# Patient Record
Sex: MALE | Race: ASIAN | Employment: FULL TIME | URBAN - METROPOLITAN AREA
[De-identification: names, ages, dates, MRNs, and addresses within clinical notes are randomized per-mention and may not be internally consistent; named-entity substitution may affect disease eponyms.]

---

## 2018-10-11 ENCOUNTER — OFFICE VISIT (OUTPATIENT)
Dept: FAMILY MEDICINE | Facility: CLINIC | Age: 33
End: 2018-10-11
Payer: COMMERCIAL

## 2018-10-11 VITALS — HEART RATE: 98 BPM | SYSTOLIC BLOOD PRESSURE: 132 MMHG | DIASTOLIC BLOOD PRESSURE: 78 MMHG | OXYGEN SATURATION: 98 %

## 2018-10-11 DIAGNOSIS — L43.9 LICHEN PLANUS: Primary | ICD-10-CM

## 2018-10-11 PROCEDURE — 86704 HEP B CORE ANTIBODY TOTAL: CPT | Performed by: FAMILY MEDICINE

## 2018-10-11 PROCEDURE — 99214 OFFICE O/P EST MOD 30 MIN: CPT | Performed by: FAMILY MEDICINE

## 2018-10-11 PROCEDURE — 36415 COLL VENOUS BLD VENIPUNCTURE: CPT | Performed by: FAMILY MEDICINE

## 2018-10-11 RX ORDER — TRIAMCINOLONE ACETONIDE 1 MG/G
CREAM TOPICAL 2 TIMES DAILY
Qty: 80 G | Refills: 0 | Status: SHIPPED | OUTPATIENT
Start: 2018-10-11 | End: 2018-11-21 | Stop reason: ALTCHOICE

## 2018-10-11 NOTE — LETTER
10/11/2018         RE: Gordy Miller  [address]  [facility] [address]        Dear Colleague,    Thank you for referring your patient, Gordy Miller, to the [facility] [place]. Please see a copy of my visit note below.    [facility] - PRIMARY CARE SKIN    CC : Rash   SUBJECTIVE:   Gordy Miller is a 33 year old male who presents to clinic today because of a(n) rash on the hands and legs that began 3-4 months ago. His skin often becomes itchy on the legs after showering. To help with this he will apply Aveeno lotion. He does note that he had a similar rash in the past on his left ankle and was treated in the [place].      Pruritic : mildly itching.  Symptoms appear to be : somewhat worsening  Aggravating factors : heat.  Relieving factors : none noted    Previous similar history : Yes: previously treated in the [place].  Recent exposure history : none known   Any other family members with similar symptoms : No.  Other current medications : none.    Therapies tried : Aveeno lotion.    Personal Medical History  Skin Cancer : NO  Eczema Psoriasis Rosacea Autoimmune   NO NO NO NO     Family Medical History  Skin Cancer : NO  Eczema Psoriasis Rosacea Autoimmune   NO NO NO NO     Occupation : student (indoor).    There is no problem list on file for this patient.      No past medical history on file. No past surgical history on file.   Social History   Substance Use Topics     Smoking status: Never Smoker     Smokeless tobacco: Never Used     Alcohol use Not on file         Prescription Medications as of 10/11/2018             triamcinolone (KENALOG) 0.1 % cream Apply topically 2 times daily Not to be used more then 10-14 consecutive days, not to be used on face or groin          No Known Allergies     INTEGUMENTARY/SKIN: POSITIVE for pruritis and rash     ROS : 14 point review of systems was negative except the symptoms listed above in the HPI.    This  document serves as a record of the services and decisions personally performed and made by Tracy Cottrell MD. It was created on her behalf by Sulma Holder, a trained medical scribe.  The creation of this document is based on the scribe's personal observations and the provider's statements to the medical scribe.  Sulma Holder, October 11, 2018 2:20 PM    OBJECTIVE:   GENERAL: healthy, alert and no distress  SKIN: Jeffery Skin Type - IV.  Face, Legs and Hands were examined. The dermatoscope was used to help evaluate pigmented lesions.  Skin Pertinent Findings:  Left medial ankle : patches of residual hypopigmentation    Right lateral ankle : linear distribution of flat topped violaceous, non-scaly, polygonal lesions    Volar wrists, forearms, dorsal hands : linear distribution of flat topped violaceous, non-scaly, polygonal lesions    Oral mucosa : clear    Diagnostic Test Results:  none     MDM : Dicussed lichen planus treatment, causes, natural history .Education pamphlet given.    ASSESSMENT:     Encounter Diagnosis   Name Primary?     Lichen planus Yes       PLAN:   Patient Instructions   FUTURE APPOINTMENTS  Follow up as needed    TOPICAL STEROID INSTRUCTIONS  Triamcinolone 0.1% cream.  1. Wash hands before applying topical steroid. Use the adult fingertip unit (FTU) as a guide.    2. Apply sparingly (just enough to rub in) onto affected areas of the hands and legs (not to exceed 1 FTU), two times per day for 10-14 days.  3. Wash off any excess, unused topical steroid.    This higher strength steroid should never be used on face nor groin.    After the initial treatment, topical steroid may be used as needed for flare-ups but only for short-term treatment.    If you are using this for prolonged periods of time to control flare-ups, return to clinic for re-evaluation of treatment.    Keep in mind to also regularly use moisturizer, as this preventative measure can help maintain your skin's natural protective  moisture barrier.      PROCEDURES:   None.    TT : 25 minutes.  CT : 20 minutes.      The information in this document, created by the medical scribe for me, accurately reflects the services I personally performed and the decisions made by me. I have reviewed and approved this document for accuracy prior to leaving the patient care area.  Tracy Cottrell MD October 11, 2018 2:20 PM  [facility] [place]    Again, thank you for allowing me to participate in the care of your patient.        Sincerely,        Gracy Cottrell MD

## 2018-10-11 NOTE — PROGRESS NOTES
[facility] - PRIMARY CARE SKIN    CC : Rash   SUBJECTIVE:   Gordy Miller is a 33 year old male who presents to clinic today because of a(n) rash on the hands and legs that began 3-4 months ago. His skin often becomes itchy on the legs after showering. To help with this he will apply Aveeno lotion. He does note that he had a similar rash in the past on his left ankle and was [place]      Pruritic : mildly itching.  Symptoms appear to be : somewhat worsening  Aggravating factors : heat.  Relieving factors : none noted    Previous similar history : Yes: previously [place]  Recent exposure history : none known   Any other family members with similar symptoms : No.  Other current medications : none.    Therapies tried : Aveeno lotion.    Personal Medical History  Skin Cancer : NO  Eczema Psoriasis Rosacea Autoimmune   NO NO NO NO     Family Medical History  Skin Cancer : NO  Eczema Psoriasis Rosacea Autoimmune   NO NO NO NO     Occupation : student (indoor).    There is no problem list on file for this patient.      No past medical history on file. No past surgical history on file.   Social History   Substance Use Topics     Smoking status: Never Smoker     Smokeless tobacco: Never Used     Alcohol use Not on file         Prescription Medications as of 10/11/2018             triamcinolone (KENALOG) 0.1 % cream Apply topically 2 times daily Not to be used more then 10-14 consecutive days, not to be used on face or groin          No Known Allergies     INTEGUMENTARY/SKIN: POSITIVE for pruritis and rash     ROS : 14 point review of systems was negative except the symptoms listed above in the HPI.    This document serves as a record of the services and decisions personally performed and made by Tracy Cottrell MD. It was created on her behalf by Sulma Holder, a trained medical scribe.  The creation of this document is based on the scribe's personal observations and the provider's  statements to the medical scribe.  Sulma Holder, October 11, 2018 2:20 PM    OBJECTIVE:   GENERAL: healthy, alert and no distress  SKIN: Jeffery Skin Type - IV.  Face, Legs and Hands were examined. The dermatoscope was used to help evaluate pigmented lesions.  Skin Pertinent Findings:  Left medial ankle : patches of residual hypopigmentation    Right lateral ankle : linear distribution of flat topped violaceous, non-scaly, polygonal lesions    Volar wrists, forearms, dorsal hands : linear distribution of flat topped violaceous, non-scaly, polygonal lesions    Oral mucosa : clear    Diagnostic Test Results:  none     MDM : Dicussed lichen planus treatment, causes, natural history .Education pamphlet given.    ASSESSMENT:     Encounter Diagnosis   Name Primary?     Lichen planus Yes       PLAN:   Patient Instructions   FUTURE APPOINTMENTS  Follow up as needed    TOPICAL STEROID INSTRUCTIONS  Triamcinolone 0.1% cream.  1. Wash hands before applying topical steroid. Use the adult fingertip unit (FTU) as a guide.    2. Apply sparingly (just enough to rub in) onto affected areas of the hands and legs (not to exceed 1 FTU), two times per day for 10-14 days.  3. Wash off any excess, unused topical steroid.    This higher strength steroid should never be used on face nor groin.    After the initial treatment, topical steroid may be used as needed for flare-ups but only for short-term treatment.    If you are using this for prolonged periods of time to control flare-ups, return to clinic for re-evaluation of treatment.    Keep in mind to also regularly use moisturizer, as this preventative measure can help maintain your skin's natural protective moisture barrier.      PROCEDURES:   None.    TT : 25 minutes.  CT : 20 minutes.      The information in this document, created by the medical scribe for me, accurately reflects the services I personally performed and the decisions made by me. I have reviewed and approved this  document for accuracy prior to leaving the patient care area.  Tracy Cottrell MD October 11, 2018 2:20 PM  [facility] [place]

## 2018-10-11 NOTE — MR AVS SNAPSHOT
After Visit Summary   10/11/2018    Gordy Miller    MRN: [id number]           Patient Information     Date Of Birth          1985        Visit Information        Provider Department      10/11/2018 2:20 PM Gracy Cottrell MD [facility] [place]        Today's Diagnoses     Lichen planus    -  1      Care Instructions    FUTURE APPOINTMENTS  Follow up in 2-3 weeks.     TOPICAL STEROID INSTRUCTIONS  Triamcinolone 0.1% cream.  1. Wash hands before applying topical steroid. Use the adult fingertip unit (FTU) as a guide.    2. Apply sparingly (just enough to rub in) onto affected areas of the hands and legs (not to exceed 1 FTU), two times per day for 10-14 days.  3. Wash off any excess, unused topical steroid.    This higher strength steroid should never be used on face nor groin.    After the initial treatment, topical steroid may be used as needed for flare-ups but only for short-term treatment.    If you are using this for prolonged periods of time to control flare-ups, return to clinic for re-evaluation of treatment.    Keep in mind to also regularly use moisturizer, as this preventative measure can help maintain your skin's natural protective moisture barrier.    DRY SKIN MANAGEMENT INSTRUCTIONS  Routine use of moisturizer is important for healthy, resilient skin not just for soft skin.     Sealing in moisture    Twice daily use of a moisturizer such as over-the-counter (OTC) CeraVe moisturizer cream (in the jar). CeraVe products contain ceramides and filaggrin proteins that can help to maintain the body's moisture layer.    After cleansing or washing, always apply moisturizer immediately after drying off (pat dry only) for best effect.    Protection while hydrating    Do not overuse soap. Unless you have been sweating extensively, just apply soap to groin and armpits.    Recommended products for body include: OTC unscented Dove for sensitive skin or OTC  "Vanicream cleansing bar.    Recommended facial cleansers include: OTC CeraVe hydrating facial cleanser or OTC Cetaphil daily facial cleanser.    Avoid use of    Scented/perfumed products    Irritating clothing (wool, new jeans, new/unwashed clothing, scratchy synthetics)    Neosporin or triple antibiotic topical products    Products containing aloe, herbs, Vitamin E, or other \"natural ingredients\".    Dryer sheets or fabric softeners (while symptoms are present)    If a topical medication is prescribed, apply topical prescription first, followed by use of moisturizing product.            Follow-ups after your visit        Follow-up notes from your care team     Return in about 3 weeks (around 11/1/2018).      Who to contact     If you have questions or need follow up information about today's clinic visit or your schedule please contact [facility] [place] directly at [phone].  Normal or non-critical lab and imaging results will be communicated to you by Critical Diagnosticshart, letter or phone within 4 business days after the clinic has received the results. If you do not hear from us within 7 days, please contact the clinic through Critical Diagnosticshart or phone. If you have a critical or abnormal lab result, we will notify you by phone as soon as possible.  Submit refill requests through Strikeface or call your pharmacy and they will forward the refill request to us. Please allow 3 business days for your refill to be completed.          Additional Information About Your Visit        Strikeface Information     Strikeface lets you send messages to your doctor, view your test results, renew your prescriptions, schedule appointments and more. To sign up, go to [email or web address] . Click on \"Log in\" on the left side of the screen, which will take you to the Welcome page. Then click on \"Sign up Now\" on the right side of the page.     You will be asked to enter the access code listed below, as well as some personal information. Please " follow the directions to create your username and password.     Your access code is: [email or web address]  Expires: 2019  2:36 PM     Your access code will  in 90 days. If you need help or a new code, please call your [place] clinic or [phone].        Care EveryWhere ID     This is your Care EveryWhere ID. This could be used by other organizations to access your [place] medical records  [id number]        Your Vitals Were     Pulse Pulse Oximetry                98 98%           Blood Pressure from Last 3 Encounters:   10/11/18 132/78    Weight from Last 3 Encounters:   No data found for Wt              We Performed the Following     Hepatitis B core antibody          Today's Medication Changes          These changes are accurate as of 10/11/18  2:36 PM.  If you have any questions, ask your nurse or doctor.               Start taking these medicines.        Dose/Directions    triamcinolone 0.1 % cream   Commonly known as:  KENALOG   Used for:  Lichen planus   Started by:  Gracy Cottrell MD        Apply topically 2 times daily Not to be used more then 10-14 consecutive days, not to be used on face or groin   Quantity:  80 g   Refills:  0            Where to get your medicines      These medications were sent to [facility] [address] [address]     Phone:  [phone]     triamcinolone 0.1 % cream                Primary Care Provider    Provider Not In System                Equal Access to Services     DONI SCHMITT AH: Hadii doreen caseyo Somagda, waaxda luqadaha, qaybta kaalmada adeegyada, waxjordan lizz cast adecarolee cain. So [facility] [phone].    ATENCIÓN: Si habla español, tiene a arita disposición servicios gratuitos de asistencia lingüística. Llame al [phone].    We comply with applicable federal civil rights laws and [place] laws. We do not discriminate on the basis of race, color,  national origin, age, disability, sex, sexual orientation, or gender identity.            Thank you!     Thank you for choosing [facility]  for your care. Our goal is always to provide you with excellent care. Hearing back from our patients is one way we can continue to improve our services. Please take a few minutes to complete the written survey that you may receive in the mail after your visit with us. Thank you!             Your Updated Medication List - Protect others around you: Learn how to safely use, store and throw away your medicines at www.disposemymeds.org.          This list is accurate as of 10/11/18  2:36 PM.  Always use your most recent med list.                   Brand Name Dispense Instructions for use Diagnosis    triamcinolone 0.1 % cream    KENALOG    80 g    Apply topically 2 times daily Not to be used more then 10-14 consecutive days, not to be used on face or groin    Lichen planus

## 2018-10-12 LAB — HBV CORE AB SERPL QL IA: NONREACTIVE

## 2018-10-24 ENCOUNTER — TELEPHONE (OUTPATIENT)
Dept: FAMILY MEDICINE | Facility: CLINIC | Age: 33
End: 2018-10-24

## 2018-10-24 NOTE — TELEPHONE ENCOUNTER
Mail returned- need accurate address.    Need to give normal test results.  Left message on answering machine for patient to call back.    Jaye Alcazar RN BSN  [facility]  [phone]'

## 2018-10-29 NOTE — TELEPHONE ENCOUNTER
Left message on answering machine for patient to call back.    Jaye Alcazar,RN BSN  [facility]  [phone]

## 2018-11-01 ENCOUNTER — OFFICE VISIT (OUTPATIENT)
Dept: FAMILY MEDICINE | Facility: CLINIC | Age: 33
End: 2018-11-01
Payer: COMMERCIAL

## 2018-11-01 VITALS — OXYGEN SATURATION: 100 % | SYSTOLIC BLOOD PRESSURE: 121 MMHG | HEART RATE: 92 BPM | DIASTOLIC BLOOD PRESSURE: 77 MMHG

## 2018-11-01 DIAGNOSIS — L43.9 LICHEN PLANUS: Primary | ICD-10-CM

## 2018-11-01 PROCEDURE — 99213 OFFICE O/P EST LOW 20 MIN: CPT | Performed by: FAMILY MEDICINE

## 2018-11-01 RX ORDER — BETAMETHASONE DIPROPIONATE 0.5 MG/G
CREAM TOPICAL
Qty: 50 G | Refills: 0 | Status: SHIPPED | OUTPATIENT
Start: 2018-11-01 | End: 2018-11-21

## 2018-11-01 NOTE — PATIENT INSTRUCTIONS
Recheck 2-3 weeks    Augmented betamethasone 0.05% cream bid daily on affected areas, legs and arms for 14 days. No more Triamcinolone use. You may moisturize after applying medication cream.

## 2018-11-01 NOTE — PROGRESS NOTES
[facility] - PRIMARY CARE SKIN    CC : Lichen planus follow up  SUBJECTIVE:                                                    Gordy Miller is a 33 year old male who presents to clinic today for follow-up of a lichen planus on thehands and legs that began 3-4 months ago. He notes that his legs often become itchy after shower, and he previously used Aveeno lotion to settle the itch.     At his visit 3 weeks ago Gordy was advised to begin twice daily use of triamcinolone 0.1% cream on the hands and legs. The lesions on the forearms has improved but the lesions on the lower legs is still itchy.    Personal Medical History  Skin Cancer : NO  Eczema Psoriasis Rosacea Autoimmune   NO NO NO NO     Family Medical History  Skin Cancer : NO  Eczema Psoriasis Rosacea Autoimmune   NO NO NO NO     Occupation : Student (indoor).    Refer to electronic medical record (EMR) for past medical history and medications.      ROS : 14 point review of systems was negative except the symptoms listed above in the HPI.        OBJECTIVE:                                                    GENERAL: healthy, alert and no distress  SKIN: Jeffery Skin Type - IV.  Arms, Legs and Hands were examined. The dermatoscope was used to help evaluate pigmented lesions.  Skin Pertinent Findings:  Forearms : violaceous macules on the left forearm, residual hyperpigmentation on the right forearm  Right lower leg : scaly, erythematous flat topped macules , lichenification    Diagnostic Test Results:  none         ASSESSMENT:                                                      Encounter Diagnosis   Name Primary?     Lichen planus Yes     PLAN:                                                    Patient Instructions   Recheck 2-3 weeks    Augmented betamethasone 0.05% cream bid daily on affected areas, legs and arms for 14 days. No more Triamcinolone use. You may moisturize after applying medication cream.       PROCEDURES:                                                     None.    TT : 20 minutes.  CT : 15 minutes.

## 2018-11-01 NOTE — MR AVS SNAPSHOT
"              After Visit Summary   11/1/2018    Gordy Miller    MRN: [id number]           Patient Information     Date Of Birth          1985        Visit Information        Provider Department      11/1/2018 2:20 PM Gracy Cottrell MD [facility] [place]        Today's Diagnoses     Lichen planus    -  1      Care Instructions    Recheck 2-3 weeks    Augmented betamethasone 0.05% cream bid daily on affected areas, legs and arms for 14 days. No more Triamcinolone use. You may moisturize after applying medication cream.          Follow-ups after your visit        Who to contact     If you have questions or need follow up information about today's clinic visit or your schedule please contact [facility] [place] at [phone].  Normal or non-critical lab and imaging results will be communicated to you by Copilot Labshart, letter or phone within 4 business days after the clinic has received the results. If you do not hear from us within 7 days, please contact the clinic through MyChart or phone. If you have a critical or abnormal lab result, we will notify you by phone as soon as possible.  Submit refill requests through [employer] or call your pharmacy and they will forward the refill request to us. Please allow 3 business days for your refill to be completed.          Additional Information About Your Visit        MyChart Information     Latest Medical lets you send messages to your doctor, view your test results, renew your prescriptions, schedule appointments and more. To sign up, go to [email or web address] . Click on \"Log in\" on the left side of the screen, which will take you to the Welcome page. Then click on \"Sign up Now\" on the right side of the page.     You will be asked to enter the access code listed below, as well as some personal information. Please follow the directions to create your username and password.     Your access code is: " [email or web address]  Expires: 2019  2:36 PM     Your access code will  in 90 days. If you need help or a new code, please call your [place] clinic or [phone].        Care EveryWhere ID     This is your Care EveryWhere ID. This could be used by other organizations to access your [place] medical records  [id number]        Your Vitals Were     Pulse Pulse Oximetry                92 100%           Blood Pressure from Last 3 Encounters:   18 121/77   10/11/18 132/78    Weight from Last 3 Encounters:   No data found for Wt              Today, you had the following     No orders found for display         Today's Medication Changes          These changes are accurate as of 18  2:40 PM.  If you have any questions, ask your nurse or doctor.               Start taking these medicines.        Dose/Directions    augmented betamethasone dipropionate 0.05 % cream   Commonly known as:  DIPROLENE-AF   Used for:  Lichen planus   Started by:  Gracy Cottrell MD        Apply to AA on hands, arms and legs bid for 14 days . not to be used on face or groin   Quantity:  50 g   Refills:  0            Where to get your medicines      These medications were sent to [facility] [address] [address]     Phone:  [phone]     augmented betamethasone dipropionate 0.05 % cream                Primary Care Provider    None Specified       No primary provider on file.        Equal Access to Services     DONI SCHMITT AH: Aramis Auguste, wacorneliusda lueladioadaha, qaybta kaalmada adeegyada, waxay lizz cast carolee lozano . So [facility] [phone].    ATENCIÓN: Si habla español, tiene a arita disposición servicios gratuitos de asistencia lingüística. Llame al [phone].    We comply with applicable federal civil rights laws and [place] laws. We do not discriminate on the basis of race, color, national origin, age,  disability, sex, sexual orientation, or gender identity.            Thank you!     Thank you for choosing [facility] [place]  for your care. Our goal is always to provide you with excellent care. Hearing back from our patients is one way we can continue to improve our services. Please take a few minutes to complete the written survey that you may receive in the mail after your visit with us. Thank you!             Your Updated Medication List - Protect others around you: Learn how to safely use, store and throw away your medicines at www.disposemymeds.org.          This list is accurate as of 11/1/18  2:40 PM.  Always use your most recent med list.                   Brand Name Dispense Instructions for use Diagnosis    augmented betamethasone dipropionate 0.05 % cream    DIPROLENE-AF    50 g    Apply to AA on hands, arms and legs bid for 14 days . not to be used on face or groin    Lichen planus       triamcinolone 0.1 % cream    KENALOG    80 g    Apply topically 2 times daily Not to be used more then 10-14 consecutive days, not to be used on face or groin    Lichen planus

## 2018-11-21 ENCOUNTER — OFFICE VISIT (OUTPATIENT)
Dept: FAMILY MEDICINE | Facility: CLINIC | Age: 33
End: 2018-11-21
Payer: COMMERCIAL

## 2018-11-21 VITALS — DIASTOLIC BLOOD PRESSURE: 80 MMHG | OXYGEN SATURATION: 99 % | HEART RATE: 76 BPM | SYSTOLIC BLOOD PRESSURE: 119 MMHG

## 2018-11-21 DIAGNOSIS — L43.9 LICHEN PLANUS: Primary | ICD-10-CM

## 2018-11-21 LAB — HBA1C MFR BLD: 5.8 % (ref 0–5.6)

## 2018-11-21 PROCEDURE — 83036 HEMOGLOBIN GLYCOSYLATED A1C: CPT | Performed by: FAMILY MEDICINE

## 2018-11-21 PROCEDURE — 36415 COLL VENOUS BLD VENIPUNCTURE: CPT | Performed by: FAMILY MEDICINE

## 2018-11-21 PROCEDURE — 99212 OFFICE O/P EST SF 10 MIN: CPT | Performed by: FAMILY MEDICINE

## 2018-11-21 RX ORDER — BETAMETHASONE DIPROPIONATE 0.5 MG/G
CREAM TOPICAL
Qty: 50 G | Refills: 1 | Status: SHIPPED | OUTPATIENT
Start: 2018-11-21 | End: 2019-07-18

## 2018-11-21 NOTE — LETTER
11/21/2018         RE: Gordy Miller  [address] [facility] [address]        Dear Colleague,    Thank you for referring your patient, Gordy Miller, to the [facility]. Please see a copy of my visit note below.    [facility] - PRIMARY CARE SKIN    CC : Lichen planus  SUBJECTIVE:                                                    Gordy Miller is a 33 year old male who presents to clinic today for follow-up of lichen planus on the hands and legs that began approximately 5 months ago.    Current treatment : augmented betamethasone dipropionate 0.05% cream, moisturizer  Response to treatment : Symptoms have improved with augmented betamethasone dipropionate. Itchiness has diminished, bubbles are decreasing. No new lesions have developed.  Side effects noted : None    He recalls that a BGL from September was 140 but he drank orange juice that morning.    Personal Medical History  Skin Cancer : NO  Eczema Psoriasis Rosacea Autoimmune   NO NO NO NO   Other : lichen planus    Family Medical History  Skin Cancer : NO  Eczema Psoriasis Rosacea Autoimmune   NO NO NO NO   Family history of diabetes : YES - in mother and father    Refer to electronic medical record (EMR) for past medical history and medications.    ROS : 14 point review of systems was negative except the symptoms listed above in the HPI.    This document serves as a record of the services and decisions personally performed and made by Tracy Cottrell MD. It was created on her behalf by Antoine Diggs, a trained medical scribe.  The creation of this document is based on the scribe's personal observations and the provider's statements to the medical scribe.  Antoine Diggs, November 21, 2018 2:52 PM      OBJECTIVE:                                                    GENERAL: healthy, alert and no distress  SKIN: Jeffery Skin Type - V.  Arms, Legs and Hands were examined. The  dermatoscope was used to help evaluate pigmented lesions.  Skin Pertinent Findings:  Right volar wrist : residual post-inflammatory hyperpigmentation    Left volar wrist : Post-inflammatory hyperpigmentation    Right lateral malleolus : residual post-inflammatory hyperpigmentation patches varying in size from 5-6 mm. No erythema.          ASSESSMENT:                                                      Encounter Diagnosis   Name Primary?     Lichen planus Yes       PLAN:                                                    There are no Patient Instructions on file for this visit.       PROCEDURES:                                                    None.    TT : 20 minutes.  CT : 15 minutes.      The information in this document, created by the medical scribe for me, accurately reflects the services I personally performed and the decisions made by me. I have reviewed and approved this document for accuracy prior to leaving the patient care area.  November 21, 2018 2:43 PM  Gracy Cottrell MD    Again, thank you for allowing me to participate in the care of your patient.        Sincerely,        Gracy Cottrell MD

## 2018-11-21 NOTE — PATIENT INSTRUCTIONS
FUTURE APPOINTMENTS  Please get labs done today.  Follow up in 4-6 month(s).    Continue applying moisturizer regularly.    TOPICAL STEROID INSTRUCTIONS  augmented betamethasone dipropionate 0.05% cream.  1. Wash hands before applying topical steroid. Use the adult fingertip unit (FTU) as a guide.    2. Apply sparingly (just enough to rub in) onto affected areas every day for 1 more week on the left arm and 2 times per day for 2 more weeks on the right ankle (not to exceed 0.5 FTU).  3. Wash off any excess, unused topical steroid.    This higher strength steroid should never be used on face nor groin.    After the initial treatment, topical steroid may be used as needed for flare-ups but only for short-term treatment.    If you are using this for prolonged periods of time to control flare-ups, return to clinic for re-evaluation of treatment.    Keep in mind to also regularly use moisturizer, as this preventative measure can help maintain your skin's natural protective moisture barrier.

## 2018-11-21 NOTE — MR AVS SNAPSHOT
After Visit Summary   11/21/2018    Gordy Miller    MRN: [id number]           Patient Information     Date Of Birth          1985        Visit Information        Provider Department      11/21/2018 2:40 PM Gracy Cottrell MD [facility] [place]        Today's Diagnoses     Lichen planus    -  1      Care Instructions    FUTURE APPOINTMENTS  Please get labs done today.  Follow up in 4-6 month(s).    Continue applying moisturizer regularly.    TOPICAL STEROID INSTRUCTIONS  augmented betamethasone dipropionate 0.05% cream.  1. Wash hands before applying topical steroid. Use the adult fingertip unit (FTU) as a guide.    2. Apply sparingly (just enough to rub in) onto affected areas every day for 1 more week on the left arm and 2 times per day for 2 more weeks on the right ankle (not to exceed 0.5 FTU).  3. Wash off any excess, unused topical steroid.    This higher strength steroid should never be used on face nor groin.    After the initial treatment, topical steroid may be used as needed for flare-ups but only for short-term treatment.    If you are using this for prolonged periods of time to control flare-ups, return to clinic for re-evaluation of treatment.    Keep in mind to also regularly use moisturizer, as this preventative measure can help maintain your skin's natural protective moisture barrier.          Follow-ups after your visit        Who to contact     If you have questions or need follow up information about today's clinic visit or your schedule please contact [facility] directly at [phone].  Normal or non-critical lab and imaging results will be communicated to you by MyChart, letter or phone within 4 business days after the clinic has received the results. If you do not hear from us within 7 days, please contact the clinic through MyChart or phone. If you have a critical or abnormal lab result, we will notify you by  phone as soon as possible.  Submit refill requests through [facility] or call your pharmacy and they will forward the refill request to us. Please allow 3 business days for your refill to be completed.          Additional Information About Your Visit        Care EveryWhere ID     This is your Care EveryWhere ID. This could be used by other organizations to access your [facility] medical records  [id number]        Your Vitals Were     Pulse Pulse Oximetry                76 99%           Blood Pressure from Last 3 Encounters:   11/21/18 119/80   11/01/18 121/77   10/11/18 132/78    Weight from Last 3 Encounters:   No data found for Wt              We Performed the Following     Hemoglobin A1c          Today's Medication Changes          These changes are accurate as of 11/21/18  2:54 PM.  If you have any questions, ask your nurse or doctor.               These medicines have changed or have updated prescriptions.        Dose/Directions    augmented betamethasone dipropionate 0.05 % cream   Commonly known as:  DIPROLENE-AF   This may have changed:  additional instructions   Used for:  Lichen planus   Changed by:  Gracy Cottrell MD        Apply to AA on hands, arms and legs bid for 14 days when needed . not to be used on face or groin   Quantity:  50 g   Refills:  1         Stop taking these medicines if you haven't already. Please contact your care team if you have questions.     triamcinolone 0.1 % cream   Commonly known as:  KENALOG   Stopped by:  Gracy Cottrell MD                Where to get your medicines      These medications were sent to [facility] [address]  [address] [facility] [address]     Phone:  [phone]     augmented betamethasone dipropionate 0.05 % cream                Primary Care Provider Fax #    Provider Not In System [phone]                Equal Access to Services     DONI SCHMITT AH: Aramis greco  abdon Auguste, emmett terrellfaizanha, lucille kaanahi brown, veronica cain. So [facility] [phone].    ATENCIÓN: Si habla español, tiene a arita disposición servicios gratuitos de asistencia lingüística. Shahbaz al [phone].    We comply with applicable federal civil rights laws and [place] laws. We do not discriminate on the basis of race, color, national origin, age, disability, sex, sexual orientation, or gender identity.            Thank you!     Thank you for choosing [facility]  for your care. Our goal is always to provide you with excellent care. Hearing back from our patients is one way we can continue to improve our services. Please take a few minutes to complete the written survey that you may receive in the mail after your visit with us. Thank you!             Your Updated Medication List - Protect others around you: Learn how to safely use, store and throw away your medicines at www.disposemymeds.org.          This list is accurate as of 11/21/18  2:54 PM.  Always use your most recent med list.                   Brand Name Dispense Instructions for use Diagnosis    augmented betamethasone dipropionate 0.05 % cream    DIPROLENE-AF    50 g    Apply to AA on hands, arms and legs bid for 14 days when needed . not to be used on face or groin    Lichen planus

## 2018-11-21 NOTE — PROGRESS NOTES
[facility] - PRIMARY CARE SKIN    CC : Lichen planus  SUBJECTIVE:                                                    Gordy Miller is a 33 year old male who presents to clinic today for follow-up of lichen planus on the hands and legs that began approximately 5 months ago.    Current treatment : augmented betamethasone dipropionate 0.05% cream, moisturizer  Response to treatment : Symptoms have improved with augmented betamethasone dipropionate. Itchiness has diminished, bubbles are decreasing. No new lesions have developed.  Side effects noted : None    He recalls that a BGL from September was 140 but he drank orange juice that morning.    Personal Medical History  Skin Cancer : NO  Eczema Psoriasis Rosacea Autoimmune   NO NO NO NO   Other : lichen planus    Family Medical History  Skin Cancer : NO  Eczema Psoriasis Rosacea Autoimmune   NO NO NO NO   Family history of diabetes : YES - in mother and father    Refer to electronic medical record (EMR) for past medical history and medications.    ROS : 14 point review of systems was negative except the symptoms listed above in the HPI.    This document serves as a record of the services and decisions personally performed and made by Tracy Cottrell MD. It was created on her behalf by Antoine Diggs, a trained medical scribe.  The creation of this document is based on the scribe's personal observations and the provider's statements to the medical scribe.  Antoine Diggs, November 21, 2018 2:52 PM      OBJECTIVE:                                                    GENERAL: healthy, alert and no distress  SKIN: Jeffery Skin Type - V.  Arms, Legs and Hands were examined. The dermatoscope was used to help evaluate pigmented lesions.  Skin Pertinent Findings:  Right volar wrist : residual post-inflammatory hyperpigmentation    Left volar wrist : Post-inflammatory hyperpigmentation    Right lateral malleolus : residual post-inflammatory hyperpigmentation  patches varying in size from 5-6 mm. No erythema.          ASSESSMENT:                                                      Encounter Diagnosis   Name Primary?     Lichen planus Yes       PLAN:                                                    There are no Patient Instructions on file for this visit.       PROCEDURES:                                                    None.    TT : 20 minutes.  CT : 15 minutes.      The information in this document, created by the medical scribe for me, accurately reflects the services I personally performed and the decisions made by me. I have reviewed and approved this document for accuracy prior to leaving the patient care area.  November 21, 2018 2:43 PM  Gracy Cottrell MD

## 2019-01-10 ENCOUNTER — OFFICE VISIT (OUTPATIENT)
Dept: FAMILY MEDICINE | Facility: CLINIC | Age: 34
End: 2019-01-10
Payer: COMMERCIAL

## 2019-01-10 VITALS
WEIGHT: 211 LBS | TEMPERATURE: 97.2 F | DIASTOLIC BLOOD PRESSURE: 87 MMHG | HEART RATE: 96 BPM | SYSTOLIC BLOOD PRESSURE: 135 MMHG

## 2019-01-10 DIAGNOSIS — R07.0 THROAT PAIN: ICD-10-CM

## 2019-01-10 DIAGNOSIS — J03.90 TONSILLITIS: Primary | ICD-10-CM

## 2019-01-10 PROCEDURE — 99214 OFFICE O/P EST MOD 30 MIN: CPT | Performed by: INTERNAL MEDICINE

## 2019-01-10 NOTE — PROGRESS NOTES
SUBJECTIVE:   Gordy Miller is a 33 year old male who presents to clinic today for the following health issues:      Tonsillitis symptoms with sore throat, left sided    Sore Throat and severe pain around the throat and neck glands for the past 2 days. Has a headache today, but no other symptoms. States a friend gave him three 500 mg tablets of azithromycin that he has taken over the past 2 days. He took 1 in the am, one in the pm yesterday and one more this morning. He does not have any more of these to take.         Current Medications:     Current Outpatient Medications   Medication Sig Dispense Refill     amoxicillin-clavulanate (AUGMENTIN) 875-125 MG tablet Take 1 tablet by mouth 2 times daily 14 tablet 0     augmented betamethasone dipropionate (DIPROLENE-AF) 0.05 % cream Apply to AA on hands, arms and legs bid for 14 days when needed . not to be used on face or groin (Patient not taking: Reported on 1/10/2019) 50 g 1         Allergies:    No Known Allergies         Past Medical History:   No past medical history on file.      Past Surgical History:   No past surgical history on file.      Family Medical History:   No family history on file.      Social History:     Social History     Socioeconomic History     Marital status:      Spouse name: Not on file     Number of children: Not on file     Years of education: Not on file     Highest education level: Not on file   Social Needs     Financial resource strain: Not on file     Food insecurity - worry: Not on file     Food insecurity - inability: Not on file     Transportation needs - medical: Not on file     Transportation needs - non-medical: Not on file   Occupational History     Not on file   Tobacco Use     Smoking status: Never Smoker     Smokeless tobacco: Never Used   Substance and Sexual Activity     Alcohol use: No     Drug use: No     Sexual activity: Yes   Other Topics Concern     Parent/sibling w/ CABG, MI or angioplasty  before 65F 55M? Not Asked   Social History Narrative     Not on file           Review of System:     Constitutional: Negative for fever or chills  Skin: Negative for rashes  Ears/Nose/Throat: Positive for left sided sore throat symptoms  Respiratory: No shortness of breath, dyspnea on exertion, cough, or hemoptysis  Cardiovascular: Negative for chest pain  Gastrointestinal: Negative for nausea, vomiting  Genitourinary: Negative for dysuria, hematuria  Musculoskeletal: Negative for myalgias  Neurologic: Negative for headaches  Psychiatric: Negative for depression, anxiety  Hematologic/Lymphatic/Immunologic: Negative  Endocrine: Negative  Behavioral: Negative for tobacco use       Physical Exam:   /87 (BP Location: Right arm, Cuff Size: Adult Large)   Pulse 96   Temp 97.2  F (36.2  C)   Wt 95.7 kg (211 lb)     GENERAL:  alert and no distress  EYES: eyes grossly normal to inspection, and conjunctivae and sclerae normal  HENT: Normocephalic atraumatic. Nose and mouth without ulcers or lesions, acute left sided tonsillitis symptoms noted  NECK: supple  RESP: lungs clear to auscultation   CV: regular rate and rhythm, normal S1 S2  LYMPH: no peripheral edema   ABDOMEN: nondistended  MS: no gross musculoskeletal defects noted  SKIN: no suspicious lesions or rashes  NEURO: Alert & Oriented x 3.   PSYCH: mentation appears normal, affect normal        Diagnostic Test Results:     Diagnostic Test Results:  Results for orders placed or performed in visit on 11/21/18   Hemoglobin A1c   Result Value Ref Range    Hemoglobin A1C 5.8 (H) 0 - 5.6 %       ASSESSMENT/PLAN:     (R07.0) Throat pain  (J03.90) Tonsillitis  (primary encounter diagnosis)  Comment: recent left sided throat pains concerning for acute tonsillitis  Plan: OTOLARYNGOLOGY REFERRAL,         amoxicillin-clavulanate (AUGMENTIN) 875-125 MG         Tablet      Follow Up Plan:     Patient is instructed to return to Internal Medicine clinic for follow-up visit in  1 week.        Bianka Sawyer MD  Internal Medicine  [facility]

## 2019-01-16 ENCOUNTER — TRANSFERRED RECORDS (OUTPATIENT)
Dept: HEALTH INFORMATION MANAGEMENT | Facility: CLINIC | Age: 34
End: 2019-01-16

## 2019-07-18 ENCOUNTER — OFFICE VISIT (OUTPATIENT)
Dept: FAMILY MEDICINE | Facility: CLINIC | Age: 34
End: 2019-07-18
Payer: COMMERCIAL

## 2019-07-18 VITALS — SYSTOLIC BLOOD PRESSURE: 118 MMHG | DIASTOLIC BLOOD PRESSURE: 72 MMHG

## 2019-07-18 DIAGNOSIS — L43.9 LICHEN PLANUS: Primary | ICD-10-CM

## 2019-07-18 PROCEDURE — 99213 OFFICE O/P EST LOW 20 MIN: CPT | Performed by: FAMILY MEDICINE

## 2019-07-18 RX ORDER — BETAMETHASONE DIPROPIONATE 0.5 MG/G
CREAM TOPICAL
Qty: 50 G | Refills: 1 | Status: SHIPPED | OUTPATIENT
Start: 2019-07-18 | End: 2019-08-15

## 2019-07-18 NOTE — PATIENT INSTRUCTIONS
FUTURE APPOINTMENTS  Follow up in 4 week(s).    TOPICAL STEROID INSTRUCTIONS  augmented betamethasone dipropionate 0.05% cream.  Apply two times per day for 2-3 weeks. Then, use only when needed.  1. Wash hands before applying topical steroid.  2. Apply sparingly (just enough to rub in) onto affected areas of the legs.    This higher strength steroid should never be used on face nor groin.    After the initial treatment, topical steroid may be used as needed for flare-ups but only for short-term treatment. If you are using this for prolonged periods of time to control flare-ups, return to clinic for re-evaluation of treatment.    Keep in mind to also regularly use moisturizer, as this preventative measure can help maintain your skin's natural protective moisture barrier.

## 2019-07-18 NOTE — LETTER
7/18/2019         RE: Gordy Miller  [address]  [facility] [address]        Dear Colleague,    Thank you for referring your patient, Gordy Miller, [facility]. Please see a copy of my visit note below.    [facility] - PRIMARY CARE SKIN    CC : Lichen planus  SUBJECTIVE:                                                    Gordy Miller is a 33 year old male who presents to clinic today for follow-up of lichen planus on the hands and legs that began in summer 2018.    He continues to note discoloration and mild itchiness of the right leg.    Current treatment :   augmented betamethasone dipropionate 0.05% cream - he has not used it recently.  moisturizer  Response to treatment : Symptoms had improved in Nov 2018 until Dec 2018. However, they have flared up intensely in the last month but only on the legs. The areas will be itchy when dry.  Side effects noted : None    Personal Medical History  Skin Cancer : NO  Eczema Psoriasis Rosacea Autoimmune   NO NO NO NO   Other : lichen planus    Family Medical History  Skin Cancer : NO  Eczema Psoriasis Rosacea Autoimmune   NO NO NO NO   Family history of diabetes : YES - in mother and father    Refer to electronic medical record (EMR) for past medical history and medications.    ROS : 14 point review of systems was negative except the symptoms listed above in the HPI.    This document serves as a record of the services and decisions personally performed and made by Gracy Cottrell MD and was created by Antoine Diggs, a trained medical scribe, based on personal observations and provider statements to the medical scribe.  July 18, 2019 8:18 AM   Antoine Diggs    OBJECTIVE:                                                    GENERAL: healthy, alert and no distress  SKIN: Jeffery Skin Type - V.  Legs, Arms, examined. The dermatoscope was used to help evaluate pigmented lesions.  Skin  Pertinent Findings:  Anterior legs: multiple smooth hyperpigmented lesions and some scattered patches of erythema. The area of involvement is 17 x 6 cm on the right leg, and more of a scattered distribution on the left leg.  Arms/ Wrists - no rash    ASSESSMENT:                                                      Encounter Diagnosis   Name Primary?     Lichen planus Yes     .     PLAN:                                                    Patient Instructions   FUTURE APPOINTMENTS  Follow up in 4 week(s).    TOPICAL STEROID INSTRUCTIONS  augmented betamethasone dipropionate 0.05% cream.  Apply two times per day for 2-3 weeks. Then, use only when needed.  1. Wash hands before applying topical steroid.  2. Apply sparingly (just enough to rub in) onto affected areas of the legs.    This higher strength steroid should never be used on face nor groin.    After the initial treatment, topical steroid may be used as needed for flare-ups but only for short-term treatment. If you are using this for prolonged periods of time to control flare-ups, return to clinic for re-evaluation of treatment.    Keep in mind to also regularly use moisturizer, as this preventative measure can help maintain your skin's natural protective moisture barrier.      TT : 20 minutes.  CT : 15 minutes.    The information in this document, created by the medical scribe for me, accurately reflects the services I personally performed and the decisions made by me. I have reviewed and approved this document for accuracy prior to leaving the patient care area.  July 18, 2019 8:18 AM  Gracy Cottrell MD  [facility]    Again, thank you for allowing me to participate in the care of your patient.        Sincerely,        Gracy Cottrell MD

## 2019-07-18 NOTE — PROGRESS NOTES
[facility] - PRIMARY CARE SKIN    CC : Lichen planus  SUBJECTIVE:                                                    Gordy Miller is a 33 year old male who presents to clinic today for follow-up of lichen planus on the hands and legs that began in summer 2018.    He continues to note discoloration and mild itchiness of the right leg.    Current treatment :   augmented betamethasone dipropionate 0.05% cream - he has not used it recently.  moisturizer  Response to treatment : Symptoms had improved in Nov 2018 until Dec 2018. However, they have flared up intensely in the last month but only on the legs. The areas will be itchy when dry.  Side effects noted : None    Personal Medical History  Skin Cancer : NO  Eczema Psoriasis Rosacea Autoimmune   NO NO NO NO   Other : lichen planus    Family Medical History  Skin Cancer : NO  Eczema Psoriasis Rosacea Autoimmune   NO NO NO NO   Family history of diabetes : YES - in mother and father    Refer to electronic medical record (EMR) for past medical history and medications.    ROS : 14 point review of systems was negative except the symptoms listed above in the HPI.    This document serves as a record of the services and decisions personally performed and made by Gracy Cottrell MD and was created by Antoine Diggs, a trained medical scribe, based on personal observations and provider statements to the medical scribe.  July 18, 2019 8:18 AM   Antoine Diggs    OBJECTIVE:                                                    GENERAL: healthy, alert and no distress  SKIN: Jeffery Skin Type - V.  Legs, Arms, examined. The dermatoscope was used to help evaluate pigmented lesions.  Skin Pertinent Findings:  Anterior legs: multiple smooth hyperpigmented lesions and some scattered patches of erythema. The area of involvement is 17 x 6 cm on the right leg, and more of a scattered distribution on the left leg.  Arms/ Wrists - no rash    ASSESSMENT:                                                       Encounter Diagnosis   Name Primary?     Lichen planus Yes     .     PLAN:                                                    Patient Instructions   FUTURE APPOINTMENTS  Follow up in 4 week(s).    TOPICAL STEROID INSTRUCTIONS  augmented betamethasone dipropionate 0.05% cream.  Apply two times per day for 2-3 weeks. Then, use only when needed.  1. Wash hands before applying topical steroid.  2. Apply sparingly (just enough to rub in) onto affected areas of the legs.    This higher strength steroid should never be used on face nor groin.    After the initial treatment, topical steroid may be used as needed for flare-ups but only for short-term treatment. If you are using this for prolonged periods of time to control flare-ups, return to clinic for re-evaluation of treatment.    Keep in mind to also regularly use moisturizer, as this preventative measure can help maintain your skin's natural protective moisture barrier.      TT : 20 minutes.  CT : 15 minutes.    The information in this document, created by the medical scribe for me, accurately reflects the services I personally performed and the decisions made by me. I have reviewed and approved this document for accuracy prior to leaving the patient care area.  July 18, 2019 8:18 AM  Gracy Cottrell MD  [facility]

## 2019-08-14 NOTE — PROGRESS NOTES
[facility] - PRIMARY CARE SKIN    CC : Lichen planus  SUBJECTIVE:                                                    Gordy Miller is a 33 year old male who presents to clinic today for follow-up of flare of lichen planus    He continues to note discoloration and mild itchiness of the right leg.    Current treatment :   augmented betamethasone dipropionate 0.05% cream  Bid for 2 more weeks  Response to treatment : left leg better, right leg better but still itchy    Personal Medical History  Skin Cancer : NO  Eczema Psoriasis Rosacea Autoimmune   NO NO NO NO   Other : lichen planus    Family Medical History  Skin Cancer : NO  Eczema Psoriasis Rosacea Autoimmune   NO NO NO NO   Family history of diabetes : YES - in mother and father    Refer to electronic medical record (EMR) for past medical history and medications.    ROS : 14 point review of systems was negative except the symptoms listed above in the HPI.        OBJECTIVE:                                                    GENERAL: healthy, alert and no distress  SKIN: Jeffery Skin Type - V.  Legs, Arms, examined. The dermatoscope was used to help evaluate pigmented lesions.  Skin Pertinent Findings:  Anterior legs: multiple smooth hyperpigmented lesions and some scattered patches of residual.  The area of involvement is 17 x 6 cm on the right leg, and more of a scattered distribution on the left leg.  Arms/ Wrists - no rash    ASSESSMENT:                                                      Encounter Diagnosis   Name Primary?     Lichen planus Yes     .     PLAN:                                                    Patient Instructions   Augmented betamethasone diproprinate 0.05% ointment two times per day for 2 more weeks on the right leg.    Recheck if needed    TT : 20 minutes.  CT : 15 minutes.

## 2019-08-15 ENCOUNTER — OFFICE VISIT (OUTPATIENT)
Dept: FAMILY MEDICINE | Facility: CLINIC | Age: 34
End: 2019-08-15
Payer: COMMERCIAL

## 2019-08-15 VITALS — DIASTOLIC BLOOD PRESSURE: 84 MMHG | SYSTOLIC BLOOD PRESSURE: 124 MMHG

## 2019-08-15 DIAGNOSIS — L43.9 LICHEN PLANUS: Primary | ICD-10-CM

## 2019-08-15 PROCEDURE — 99213 OFFICE O/P EST LOW 20 MIN: CPT | Performed by: FAMILY MEDICINE

## 2019-08-15 RX ORDER — BETAMETHASONE DIPROPIONATE 0.5 MG/G
CREAM TOPICAL
Qty: 50 G | Refills: 1 | Status: SHIPPED | OUTPATIENT
Start: 2019-08-15 | End: 2021-06-23

## 2019-08-15 NOTE — LETTER
8/15/2019         RE: Gordy Miller  [address]  [facility] [address]        Dear Colleague,    Thank you for referring your patient, Gordy Miller, to the [facility]. Please see a copy of my visit note below.    [facility] - PRIMARY CARE SKIN    CC : Lichen planus  SUBJECTIVE:                                                    Gordy Miller is a 33 year old male who presents to clinic today for follow-up of flare of lichen planus    He continues to note discoloration and mild itchiness of the right leg.    Current treatment :   augmented betamethasone dipropionate 0.05% cream  Bid for 2 more weeks  Response to treatment : left leg better, right leg better but still itchy    Personal Medical History  Skin Cancer : NO  Eczema Psoriasis Rosacea Autoimmune   NO NO NO NO   Other : lichen planus    Family Medical History  Skin Cancer : NO  Eczema Psoriasis Rosacea Autoimmune   NO NO NO NO   Family history of diabetes : YES - in mother and father    Refer to electronic medical record (EMR) for past medical history and medications.    ROS : 14 point review of systems was negative except the symptoms listed above in the HPI.        OBJECTIVE:                                                    GENERAL: healthy, alert and no distress  SKIN: Jeffery Skin Type - V.  Legs, Arms, examined. The dermatoscope was used to help evaluate pigmented lesions.  Skin Pertinent Findings:  Anterior legs: multiple smooth hyperpigmented lesions and some scattered patches of residual.  The area of involvement is 17 x 6 cm on the right leg, and more of a scattered distribution on the left leg.  Arms/ Wrists - no rash    ASSESSMENT:                                                      Encounter Diagnosis   Name Primary?     Lichen planus Yes     .     PLAN:                                                    Patient Instructions   Augmented betamethasone  diproprinate 0.05% ointment two times per day for 2 more weeks on the right leg.    Recheck if needed    TT : 20 minutes.  CT : 15 minutes.      Again, thank you for allowing me to participate in the care of your patient.        Sincerely,        Gracy Cottrell MD

## 2019-08-15 NOTE — PATIENT INSTRUCTIONS
Augmented betamethasone diproprinate 0.05% ointment two times per day for 2 more weeks on the right leg.    Recheck if needed

## 2021-01-03 ENCOUNTER — HEALTH MAINTENANCE LETTER (OUTPATIENT)
Age: 36
End: 2021-01-03

## 2021-05-28 ENCOUNTER — OFFICE VISIT (OUTPATIENT)
Dept: FAMILY MEDICINE | Facility: CLINIC | Age: 36
End: 2021-05-28
Payer: COMMERCIAL

## 2021-05-28 VITALS
TEMPERATURE: 97 F | DIASTOLIC BLOOD PRESSURE: 80 MMHG | OXYGEN SATURATION: 98 % | HEART RATE: 80 BPM | SYSTOLIC BLOOD PRESSURE: 122 MMHG | WEIGHT: 210.2 LBS

## 2021-05-28 DIAGNOSIS — Z13.220 ENCOUNTER FOR LIPID SCREENING FOR CARDIOVASCULAR DISEASE: ICD-10-CM

## 2021-05-28 DIAGNOSIS — R73.03 PREDIABETES: ICD-10-CM

## 2021-05-28 DIAGNOSIS — B07.0 PLANTAR WARTS: Primary | ICD-10-CM

## 2021-05-28 DIAGNOSIS — Z13.6 ENCOUNTER FOR LIPID SCREENING FOR CARDIOVASCULAR DISEASE: ICD-10-CM

## 2021-05-28 DIAGNOSIS — Z11.59 NEED FOR HEPATITIS C SCREENING TEST: ICD-10-CM

## 2021-05-28 DIAGNOSIS — Z11.4 SCREENING FOR HIV (HUMAN IMMUNODEFICIENCY VIRUS): ICD-10-CM

## 2021-05-28 LAB
ALBUMIN SERPL-MCNC: 4.2 G/DL (ref 3.4–5)
ALP SERPL-CCNC: 55 U/L (ref 40–150)
ALT SERPL W P-5'-P-CCNC: 36 U/L (ref 0–70)
ANION GAP SERPL CALCULATED.3IONS-SCNC: 6 MMOL/L (ref 3–14)
AST SERPL W P-5'-P-CCNC: 17 U/L (ref 0–45)
BASOPHILS # BLD AUTO: 0 10E9/L (ref 0–0.2)
BASOPHILS NFR BLD AUTO: 0.3 %
BILIRUB SERPL-MCNC: 0.6 MG/DL (ref 0.2–1.3)
BUN SERPL-MCNC: 11 MG/DL (ref 7–30)
CALCIUM SERPL-MCNC: 9.6 MG/DL (ref 8.5–10.1)
CHLORIDE SERPL-SCNC: 104 MMOL/L (ref 94–109)
CHOLEST SERPL-MCNC: 253 MG/DL
CO2 SERPL-SCNC: 27 MMOL/L (ref 20–32)
CREAT SERPL-MCNC: 0.91 MG/DL (ref 0.66–1.25)
DIFFERENTIAL METHOD BLD: NORMAL
EOSINOPHIL # BLD AUTO: 0.2 10E9/L (ref 0–0.7)
EOSINOPHIL NFR BLD AUTO: 3.1 %
ERYTHROCYTE [DISTWIDTH] IN BLOOD BY AUTOMATED COUNT: 12.7 % (ref 10–15)
GFR SERPL CREATININE-BSD FRML MDRD: >90 ML/MIN/{1.73_M2}
GLUCOSE SERPL-MCNC: 110 MG/DL (ref 70–99)
HBA1C MFR BLD: 6.3 % (ref 0–5.6)
HCT VFR BLD AUTO: 49.4 % (ref 40–53)
HCV AB SERPL QL IA: NONREACTIVE
HDLC SERPL-MCNC: 45 MG/DL
HGB BLD-MCNC: 17.4 G/DL (ref 13.3–17.7)
HIV 1+2 AB+HIV1 P24 AG SERPL QL IA: NONREACTIVE
LDLC SERPL CALC-MCNC: 172 MG/DL
LYMPHOCYTES # BLD AUTO: 3 10E9/L (ref 0.8–5.3)
LYMPHOCYTES NFR BLD AUTO: 42.9 %
MCH RBC QN AUTO: 30.3 PG (ref 26.5–33)
MCHC RBC AUTO-ENTMCNC: 35.2 G/DL (ref 31.5–36.5)
MCV RBC AUTO: 86 FL (ref 78–100)
MONOCYTES # BLD AUTO: 0.5 10E9/L (ref 0–1.3)
MONOCYTES NFR BLD AUTO: 6.7 %
NEUTROPHILS # BLD AUTO: 3.3 10E9/L (ref 1.6–8.3)
NEUTROPHILS NFR BLD AUTO: 47 %
NONHDLC SERPL-MCNC: 208 MG/DL
PLATELET # BLD AUTO: 222 10E9/L (ref 150–450)
POTASSIUM SERPL-SCNC: 3.8 MMOL/L (ref 3.4–5.3)
PROT SERPL-MCNC: 8 G/DL (ref 6.8–8.8)
RBC # BLD AUTO: 5.74 10E12/L (ref 4.4–5.9)
SODIUM SERPL-SCNC: 137 MMOL/L (ref 133–144)
TRIGL SERPL-MCNC: 178 MG/DL
WBC # BLD AUTO: 7.1 10E9/L (ref 4–11)

## 2021-05-28 PROCEDURE — 83036 HEMOGLOBIN GLYCOSYLATED A1C: CPT | Performed by: INTERNAL MEDICINE

## 2021-05-28 PROCEDURE — 36415 COLL VENOUS BLD VENIPUNCTURE: CPT | Performed by: INTERNAL MEDICINE

## 2021-05-28 PROCEDURE — 17110 DESTRUCTION B9 LES UP TO 14: CPT | Performed by: INTERNAL MEDICINE

## 2021-05-28 PROCEDURE — 85025 COMPLETE CBC W/AUTO DIFF WBC: CPT | Performed by: INTERNAL MEDICINE

## 2021-05-28 PROCEDURE — 86803 HEPATITIS C AB TEST: CPT | Performed by: INTERNAL MEDICINE

## 2021-05-28 PROCEDURE — 99212 OFFICE O/P EST SF 10 MIN: CPT | Mod: 25 | Performed by: INTERNAL MEDICINE

## 2021-05-28 PROCEDURE — 80061 LIPID PANEL: CPT | Performed by: INTERNAL MEDICINE

## 2021-05-28 PROCEDURE — 80053 COMPREHEN METABOLIC PANEL: CPT | Performed by: INTERNAL MEDICINE

## 2021-05-28 PROCEDURE — 87389 HIV-1 AG W/HIV-1&-2 AB AG IA: CPT | Performed by: INTERNAL MEDICINE

## 2021-05-28 RX ORDER — CYCLOBENZAPRINE HCL 10 MG
10 TABLET ORAL
COMMUNITY
Start: 2020-01-09

## 2021-05-28 RX ORDER — PREDNISONE 50 MG/1
50 TABLET ORAL
COMMUNITY
Start: 2020-02-13

## 2021-05-28 ASSESSMENT — PATIENT HEALTH QUESTIONNAIRE - PHQ9
SUM OF ALL RESPONSES TO PHQ QUESTIONS 1-9: 3
5. POOR APPETITE OR OVEREATING: NOT AT ALL

## 2021-05-28 ASSESSMENT — ANXIETY QUESTIONNAIRES
3. WORRYING TOO MUCH ABOUT DIFFERENT THINGS: NOT AT ALL
1. FEELING NERVOUS, ANXIOUS, OR ON EDGE: NOT AT ALL
GAD7 TOTAL SCORE: 1
2. NOT BEING ABLE TO STOP OR CONTROL WORRYING: NOT AT ALL
6. BECOMING EASILY ANNOYED OR IRRITABLE: SEVERAL DAYS
5. BEING SO RESTLESS THAT IT IS HARD TO SIT STILL: NOT AT ALL
7. FEELING AFRAID AS IF SOMETHING AWFUL MIGHT HAPPEN: NOT AT ALL
IF YOU CHECKED OFF ANY PROBLEMS ON THIS QUESTIONNAIRE, HOW DIFFICULT HAVE THESE PROBLEMS MADE IT FOR YOU TO DO YOUR WORK, TAKE CARE OF THINGS AT HOME, OR GET ALONG WITH OTHER PEOPLE: NOT DIFFICULT AT ALL

## 2021-05-28 NOTE — PROCEDURES
Procedure: Cutaneous cryotherapy - 3 Plantar warts on Rt foot    **NOTE: Sterile technique was maintained throughout procedure       1) Risks, benefits and alternatives of procedure were discussed with patient including, but not limited to: poor cosmetic outcome (blistering, hypopigmentation, scarring [rare]) and bleeding, low risk of infection and minimal wound care, and watchful waiting, respectively.     2) Patient verbalizes consent to proceed with procedure.     3) Location identified with patient prior to procedure start as above.     4) Area cleaned with alcohol swabs.      5) Multiple 2-3 second bursts of cryotherapy were applied to lesion, waiting 1-2 seconds between bursts until blanching occurred. This process was completed  times in total.     6) Bandaid applied to cover lesion.     7) Patient tolerated procedure well.  Patient verbalized understanding that pain, as well as blistering or scabbing reaction would likely occur. Patient reminded not pick at the area and to expect possible hypopigmented scars from the procedure. Return if lesion fails to fully resolve.

## 2021-05-28 NOTE — PATIENT INSTRUCTIONS
As discussed, Will do Cryotherapy for the plantar warts which are more thickened and might require another sitting for this.     Ordered the labs required.     ======================    After Cryotherapy    The treated area will become red soon after your procedure. It also may blister and swell. If this happens, don't break open the blister.    You may also see clear drainage on the treated area. This is normal.    The treated area will heal in about 7 to 10 days. It will probably not leave a scar.  Caring for yourself after cryotherapy    Starting the day after your procedure, wash the treated area gently with fragrance-free soap and water daily.    Leave the treated area uncovered. Ifyou have any drainainge, you can cover the area with a bandage (Band-Aid ).    If the treated area develops a crust, you can apply petroleum jelly (Vaseline ) on until the crust falls off.    If you have any bleeding, press firmly on the area with a clean gauze pad for 15 minutes. If the bleeding doesn't stop, repeat this step. If the bleeding still hasn't stopped after repeating this step, call your doctor's office.    Don't use scented soap, makeup, or lotion on the treated area until it has healed. This will usually be at least 10 days after your procedure.    You may lose some hair on the treated area. This depends on how deep the freezing went. The hair loss may be permanent.    Once the treated area has healed, apply a broad-spectrum sunscreen with an SPF of at least 30 to the area to protect it from scarring.    You may have discoloration (pinkness, redness, or lighter or darker skin) at the treated area for up to 1 year after your procedure. Some people may have it for even longer or it may be permanent.

## 2021-05-28 NOTE — PROGRESS NOTES
Assessment and Plan  1. Plantar warts  New problem, as discussed with the extensive nature of this large plantar wart in the midfoot and the other 2 on the medial aspect of the foot. Cryotherapy done in the office today. After care instructions given.  - CBC with platelets differential  - Comprehensive metabolic panel  - DESTRUCT BENIGN LESION, UP TO 14    2. Prediabetes  Given the foot warts and changes with calluses, will make sure his previous A1C in 2018 at prediabetes is not progressing to Diabetes given the positive family history of diabetes.    - Hemoglobin A1c    3. Screening for HIV (human immunodeficiency virus)  - HIV Antigen Antibody Combo    4. Need for hepatitis C screening test  - Hepatitis C Screen Reflex to HCV RNA Quant and Genotype    5. Encounter for lipid screening for cardiovascular disease  - Lipid panel reflex to direct LDL Fasting         Patient Instructions   As discussed, Will do Cryotherapy for the plantar warts which are more thickened and might require another sitting for this.     Ordered the labs required.     ======================    After Cryotherapy    The treated area will become red soon after your procedure. It also may blister and swell. If this happens, don't break open the blister.    You may also see clear drainage on the treated area. This is normal.    The treated area will heal in about 7 to 10 days. It will probably not leave a scar.  Caring for yourself after cryotherapy    Starting the day after your procedure, wash the treated area gently with fragrance-free soap and water daily.    Leave the treated area uncovered. Ifyou have any drainainge, you can cover the area with a bandage (Band-Aid ).    If the treated area develops a crust, you can apply petroleum jelly (Vaseline ) on until the crust falls off.    If you have any bleeding, press firmly on the area with a clean gauze pad for 15 minutes. If the bleeding doesn't stop, repeat this step. If the bleeding still  hasn't stopped after repeating this step, call your doctor's office.    Don't use scented soap, makeup, or lotion on the treated area until it has healed. This will usually be at least 10 days after your procedure.    You may lose some hair on the treated area. This depends on how deep the freezing went. The hair loss may be permanent.    Once the treated area has healed, apply a broad-spectrum sunscreen with an SPF of at least 30 to the area to protect it from scarring.    You may have discoloration (pinkness, redness, or lighter or darker skin) at the treated area for up to 1 year after your procedure. Some people may have it for even longer or it may be permanent.      Return in about 4 weeks (around 6/25/2021), or if symptoms worsen or fail to improve, for Preventative Visit.    Helen Murdock MD  [facility] is a 35 year old who presents for the following health issues     HPI     Musculoskeletal problem/pain  Onset/Duration: 2 weeks  Description  Location: foot - right  Joint Swelling: no  Redness: no  Pain: YES  Warmth: no  Intensity:  mild  Progression of Symptoms:  same  Accompanying signs and symptoms:   Fevers: no  Numbness/tingling/weakness: no  History  Trauma to the area: no  Recent illness:  no  Previous similar problem: no  Previous evaluation:  no  Precipitating or alleviating factors:  Aggravating factors include: none  Therapies tried and outcome: otc liquid callous removal      No Known Allergies     History reviewed. No pertinent past medical history.    History reviewed. No pertinent surgical history.    History reviewed. No pertinent family history.    Social History     Tobacco Use     Smoking status: Never Smoker     Smokeless tobacco: Never Used   Substance Use Topics     Alcohol use: No        Current Outpatient Medications   Medication     cyclobenzaprine (FLEXERIL) 10 MG tablet     predniSONE (DELTASONE) 50 MG tablet     augmented  betamethasone dipropionate (DIPROLENE-AF) 0.05 % external cream     No current facility-administered medications for this visit.         Review of Systems   Constitutional, HEENT, cardiovascular, pulmonary, GI, , musculoskeletal, neuro, skin, endocrine and psych systems are negative, except as otherwise noted.      Objective    /80 (Cuff Size: Adult Large)   Pulse 80   Temp 97  F (36.1  C) (Tympanic)   Wt 95.3 kg (210 lb 3.2 oz)   SpO2 98%   There is no height or weight on file to calculate BMI.  Physical Exam   GENERAL: healthy, alert and no distress  NECK: no adenopathy, no asymmetry, masses, or scars and thyroid normal to palpation  RESP: lungs clear to auscultation - no rales, rhonchi or wheezes  CV: regular rate and rhythm, normal S1 S2, no S3 or S4, no murmur, click or rub, no peripheral edema and peripheral pulses strong  ABDOMEN: soft, nontender, no hepatosplenomegaly, no masses and bowel sounds normal  MS: no gross musculoskeletal defects noted, no edema  Rt Foot : POSITIVE for 3 plantar warts , one large measuring 3 cm in diameter and 2 small on the medial aspect of the plantar side measuring 1.5 cm in size.    Labs and imaging reviewed and discussed with the patient.

## 2021-05-29 ASSESSMENT — ANXIETY QUESTIONNAIRES: GAD7 TOTAL SCORE: 1

## 2021-05-29 NOTE — RESULT ENCOUNTER NOTE
"Your Cholesterol levels are abnormally high and given your risk of worsening Prediabetes we will need to get this under control. Recommend dietery management of avoiding high fat foods and red meat . Life style measures on weight reduction recommended.     Your lab work is POSITIVE for Prediabetes.Please follow the diet below for improvement. Will need follow up on this. Will need repeat labs in 3 months before we decide on starting medications and avoid complications on foot changes.    American Diabetes Association (ADA) nutritional guidelines, which do not give specific total dietary compositional targets except for the following recommendations [1] that are in large part similar to the recommendations for the general population (see \"Healthy diet in adults\"):  ?A diet that includes carbohydrates from fruits, vegetables, whole grains, legumes, and low-fat milk is encouraged.  The ideal amount of carbohydrate intake is uncertain. However, monitoring carbohydrate intake (carbohydrate counting or experience-based estimation) is important in patients with diabetes, as carbohydrate intake directly determines postprandial blood glucose, and appropriate insulin adjustment for identified quantities of carbohydrate is one of the most important factors that can improve glycemic control.  ?A variety of eating patterns (Mediterranean, low fat, low carbohydrate, vegetarian) are acceptable.  ?Fat quality is more important than fat quantity. Trans fats contribute to coronary heart disease, while mono- and polyunsaturated fats (eg, those found in fish, olive oil, nuts) are relatively protective. Saturated fatty acids and different food sources of saturated fat have divergent effects on cardiovascular and metabolic health. Trans fatty acid consumption should be kept as low as possible.  ?Protein intake goals should be individualized but not lower than 0.8 g/kg body weight per day (the recommended daily allowance). Patients should " be encouraged to substitute lean meats, fish, eggs, beans, peas, soy products, and nuts and seeds for red meat.  An automatic reduction of dietary protein intake (eg, 15 to 19 percent of calories) below usual protein intake in patients who develop diabetic kidney disease is not recommended. The role of dietary protein restriction is uncertain, particularly in view of problems with compliance in patients already being treated with saturated fat and simple carbohydrate restriction. Furthermore, it is uncertain if a low-protein diet is significantly additive to other measures aimed at reducing cardiovascular risk and preserving renal function, such as angiotensin-converting enzyme (ACE) inhibition and aggressive control of blood pressure and blood glucose.  The usual daily intake of protein should be approximately 10 to 20 percent of total caloric intake. Higher levels of dietary protein intake (>20 percent of calories from protein or >1.3 g/kg/day) have been associated with increased albuminuria, more rapid kidney function loss, and cardiovascular disease (CVD) mortality and therefore should be avoided [69].  ?Fiber intake should be at least 14 grams per 1000 calories daily; higher fiber intake may improve glycemic control.  ?A reduced sodium intake of 2300 mg per day with a diet high in fruits, vegetables, and low-fat dairy products is prudent and has demonstrated beneficial effects on blood pressure.  ?Sugar-sweetened beverages should be avoided in order to control glycemia, weight, and reduce risk for CVD and fatty liver. Consumption of foods with added sugar that have the capacity to displace healthier, more nutrient-dense food choices should be minimized. Care should be taken to avoid excess calories from sucrose; however, foods containing sucrose may be substituted for other carbohydrates or covered with insulin or insulin secretagogue medications.  ?Sugar alcohols and non-nutritive sweeteners are safe when  consumed within daily levels established by the US Food and Drug Administration (FDA). When calculating carbohydrate content of foods, one-half of the sugar alcohol content can be counted in the total carbohydrate content of the food. Use of sugar alcohols needs to be balanced with their potential to cause gastrointestinal side effects in sensitive individuals.        AVOID THESE FOODS WITH HIGH GLYCEMIC INDEX      Dietary glycemic indices and glycemic load for the top 20 carbohydrate-contributing foods in the Nurses' Health Study in 1984  Foods Glycemic index*, % Carbohydrate per serving, g Glycemic load per serving  1. Cooked potatoes (mashed or baked)  102 37 38  2. White bread 100 13 13  3. Cold breakfast cereal Varies by cereal Varies by cereal Varies by cereal  4. Dark bread 102 12 12  5. Orange juice 75 20 15  6. Banana 88 27 24  7. White rice 102 45 46  8. Pizza 86 78 68  9. Pasta 71 40 28  10. English muffins 84 26 22  11. Fruit punch 95 44 42  12. Cola 90 39 35  13. Apple 55 21 12  14. Skim milk 46 11 5  15. Pancake 119 56 67  16. Table sugar 84 4 3  17. Jam 91 13 12  18. Cranberry juice 105 19 20  19. French fries 95 35 33  20. Candy 99 28 28    Please let me know if you have any questions.  Helen Murdock MD on 5/28/2021 at 8:27 PM

## 2021-06-01 ENCOUNTER — TELEPHONE (OUTPATIENT)
Dept: FAMILY MEDICINE | Facility: CLINIC | Age: 36
End: 2021-06-01

## 2021-06-01 NOTE — TELEPHONE ENCOUNTER
"----- Message from Helen Murdock MD sent at 5/28/2021  8:49 PM CDT -----  Your Cholesterol levels are abnormally high and given your risk of worsening Prediabetes we will need to get this under control. Recommend dietery management of avoiding high fat foods and red meat . Life style measures on weight reduction recommended.     Your lab work is POSITIVE for Prediabetes.Please follow the diet below for improvement. Will need follow up on this. Will need repeat labs in 3 months before we decide on starting medications and avoid complications on foot changes.    American Diabetes Association (ADA) nutritional guidelines, which do not give specific total dietary compositional targets except for the following recommendations [1] that are in large part similar to the recommendations for the general population (see \"Healthy diet in adults\"):  ?A diet that includes carbohydrates from fruits, vegetables, whole grains, legumes, and low-fat milk is encouraged.  The ideal amount of carbohydrate intake is uncertain. However, monitoring carbohydrate intake (carbohydrate counting or experience-based estimation) is important in patients with diabetes, as carbohydrate intake directly determines postprandial blood glucose, and appropriate insulin adjustment for identified quantities of carbohydrate is one of the most important factors that can improve glycemic control.  ?A variety of eating patterns (Mediterranean, low fat, low carbohydrate, vegetarian) are acceptable.  ?Fat quality is more important than fat quantity. Trans fats contribute to coronary heart disease, while mono- and polyunsaturated fats (eg, those found in fish, olive oil, nuts) are relatively protective. Saturated fatty acids and different food sources of saturated fat have divergent effects on cardiovascular and metabolic health. Trans fatty acid consumption should be kept as low as possible.  ?Protein intake goals should be individualized but not lower than " 0.8 g/kg body weight per day (the recommended daily allowance). Patients should be encouraged to substitute lean meats, fish, eggs, beans, peas, soy products, and nuts and seeds for red meat.  An automatic reduction of dietary protein intake (eg, 15 to 19 percent of calories) below usual protein intake in patients who develop diabetic kidney disease is not recommended. The role of dietary protein restriction is uncertain, particularly in view of problems with compliance in patients already being treated with saturated fat and simple carbohydrate restriction. Furthermore, it is uncertain if a low-protein diet is significantly additive to other measures aimed at reducing cardiovascular risk and preserving renal function, such as angiotensin-converting enzyme (ACE) inhibition and aggressive control of blood pressure and blood glucose.  The usual daily intake of protein should be approximately 10 to 20 percent of total caloric intake. Higher levels of dietary protein intake (>20 percent of calories from protein or >1.3 g/kg/day) have been associated with increased albuminuria, more rapid kidney function loss, and cardiovascular disease (CVD) mortality and therefore should be avoided [69].  ?Fiber intake should be at least 14 grams per 1000 calories daily; higher fiber intake may improve glycemic control.  ?A reduced sodium intake of 2300 mg per day with a diet high in fruits, vegetables, and low-fat dairy products is prudent and has demonstrated beneficial effects on blood pressure.  ?Sugar-sweetened beverages should be avoided in order to control glycemia, weight, and reduce risk for CVD and fatty liver. Consumption of foods with added sugar that have the capacity to displace healthier, more nutrient-dense food choices should be minimized. Care should be taken to avoid excess calories from sucrose; however, foods containing sucrose may be substituted for other carbohydrates or covered with insulin or insulin  secretagogue medications.  ?Sugar alcohols and non-nutritive sweeteners are safe when consumed within daily levels established by the US Food and Drug Administration (FDA). When calculating carbohydrate content of foods, one-half of the sugar alcohol content can be counted in the total carbohydrate content of the food. Use of sugar alcohols needs to be balanced with their potential to cause gastrointestinal side effects in sensitive individuals.        AVOID THESE FOODS WITH HIGH GLYCEMIC INDEX      Dietary glycemic indices and glycemic load for the top 20 carbohydrate-contributing foods in the Nurses' Health Study in 1984  Foods Glycemic index#, % Carbohydrate per serving, g Glycemic load per serving  1. Cooked potatoes (mashed or baked)  102 37 38  2. White bread 100 13 13  3. Cold breakfast cereal Varies by cereal Varies by cereal Varies by cereal  4. Dark bread 102 12 12  5. Orange juice 75 20 15  6. Banana 88 27 24  7. White rice 102 45 46  8. Pizza 86 78 68  9. Pasta 71 40 28  10. English muffins 84 26 22  11. Fruit punch 95 44 42  12. Cola 90 39 35  13. Apple 55 21 12  14. Skim milk 46 11 5  15. Pancake 119 56 67  16. Table sugar 84 4 3  17. Jam 91 13 12  18. Cranberry juice 105 19 20  19. French fries 95 35 33  20. Candy 99 28 28    Please let me know if you have any questions.  Helen Murdock MD on 5/28/2021 at 8:27 PM

## 2021-06-01 NOTE — LETTER
"June 1, 2021      Gordy Ashby Paul  [id number]  [facility] [place] [address]        Dear Gordy Ashby,     Your Cholesterol levels are abnormally high and given your risk of worsening Prediabetes we will need to get this under control. Recommend dietery management of avoiding high fat foods and red meat . Life style measures on weight reduction recommended.     Your lab work is POSITIVE for Prediabetes.Please follow the diet below for improvement. Will need follow up on this. Will need repeat labs in 3 months before we decide on starting medications and avoid complications on foot changes.     American Diabetes Association (ADA) nutritional guidelines, which do not give specific total dietary compositional targets except for the following recommendations [1] that are in large part similar to the recommendations for the general population (see \"Healthy diet in adults\"):   A diet that includes carbohydrates from fruits, vegetables, whole grains, legumes, and low-fat milk is encouraged.   The ideal amount of carbohydrate intake is uncertain. However, monitoring carbohydrate intake (carbohydrate counting or experience-based estimation) is important in patients with diabetes, as carbohydrate intake directly determines postprandial blood glucose, and appropriate insulin adjustment for identified quantities of carbohydrate is one of the most important factors that can improve glycemic control.   A variety of eating patterns (Mediterranean, low fat, low carbohydrate, vegetarian) are acceptable.   Fat quality is more important than fat quantity. Trans fats contribute to coronary heart disease, while mono- and polyunsaturated fats (eg, those found in fish, olive oil, nuts) are relatively protective. Saturated fatty acids and different food sources of saturated fat have divergent effects on cardiovascular and metabolic health. Trans fatty acid consumption should be kept as low as possible.   Protein " intake goals should be individualized but not lower than 0.8 g/kg body weight per day (the recommended daily allowance). Patients should be encouraged to substitute lean meats, fish, eggs, beans, peas, soy products, and nuts and seeds for red meat.   An automatic reduction of dietary protein intake (eg, 15 to 19 percent of calories) below usual protein intake in patients who develop diabetic kidney disease is not recommended. The role of dietary protein restriction is uncertain, particularly in view of problems with compliance in patients already being treated with saturated fat and simple carbohydrate restriction. Furthermore, it is uncertain if a low-protein diet is significantly additive to other measures aimed at reducing cardiovascular risk and preserving renal function, such as angiotensin-converting enzyme (ACE) inhibition and aggressive control of blood pressure and blood glucose.   The usual daily intake of protein should be approximately 10 to 20 percent of total caloric intake. Higher levels of dietary protein intake (>20 percent of calories from protein or >1.3 g/kg/day) have been associated with increased albuminuria, more rapid kidney function loss, and cardiovascular disease (CVD) mortality and therefore should be avoided [69].   Fiber intake should be at least 14 grams per 1000 calories daily; higher fiber intake may improve glycemic control.     A reduced sodium intake of 2300 mg per day with a diet high in fruits, vegetables, and low-fat dairy products is prudent and has demonstrated beneficial effects on blood pressure.   Sugar-sweetened beverages should be avoided in order to control glycemia, weight, and reduce risk for CVD and fatty liver. Consumption of foods with added sugar that have the capacity to displace healthier, more nutrient-dense food choices should be minimized. Care should be taken to avoid excess calories from sucrose; however, foods containing sucrose may be substituted for  other carbohydrates or covered with insulin or insulin secretagogue medications.     Sugar alcohols and non-nutritive sweeteners are safe when consumed within daily levels established by the US Food and Drug Administration (FDA). When calculating carbohydrate content of foods, one-half of the sugar alcohol content can be counted in the total carbohydrate content of the food. Use of sugar alcohols needs to be balanced with their potential to cause gastrointestinal side effects in sensitive individuals.     AVOID THESE FOODS WITH HIGH GLYCEMIC INDEX   Dietary glycemic indices and glycemic load for the top 20 carbohydrate-contributing foods in the Nurses' Health Study in 1984   Foods Glycemic index#, % Carbohydrate per serving, g Glycemic load per serving   1. Cooked potatoes (mashed or baked) 102 37 38   2. White bread 100 13 13   3. Cold breakfast cereal Varies by cereal Varies by cereal Varies by cereal   4. Dark bread 102 12 12   5. Orange juice 75 20 15   6. Banana 88 27 24   7. White rice 102 45 46   8. Pizza 86 78 68   9. Pasta 71 40 28   10. English muffins 84 26 22   11. Fruit punch 95 44 42   12. Cola 90 39 35   13. Apple 55 21 12   14. Skim milk 46 11 5   15. Pancake 119 56 67   16. Table sugar 84 4 3   17. Jam 91 13 12   18. Cranberry juice 105 19 20   19. French fries 95 35 33   20. Candy 99 28 28     Please let me know if you have any questions.     Sincerely,    Helen Murdock MD

## 2021-06-23 ENCOUNTER — OFFICE VISIT (OUTPATIENT)
Dept: FAMILY MEDICINE | Facility: CLINIC | Age: 36
End: 2021-06-23
Payer: COMMERCIAL

## 2021-06-23 VITALS
HEART RATE: 79 BPM | OXYGEN SATURATION: 96 % | WEIGHT: 206 LBS | TEMPERATURE: 97.7 F | SYSTOLIC BLOOD PRESSURE: 120 MMHG | DIASTOLIC BLOOD PRESSURE: 80 MMHG

## 2021-06-23 DIAGNOSIS — B07.0 PLANTAR WARTS: Primary | ICD-10-CM

## 2021-06-23 PROCEDURE — 17110 DESTRUCTION B9 LES UP TO 14: CPT | Performed by: INTERNAL MEDICINE

## 2021-06-23 ASSESSMENT — PAIN SCALES - GENERAL: PAINLEVEL: EXTREME PAIN (8)

## 2021-06-23 NOTE — PROCEDURES
Procedure: Cutaneous cryotherapy Single lesion on the Rt foot    **NOTE: Sterile technique was maintained throughout procedure       1) Risks, benefits and alternatives of procedure were discussed with patient including, but not limited to: poor cosmetic outcome (blistering, hypopigmentation, scarring [rare]) and bleeding, low risk of infection and minimal wound care, and watchful waiting, respectively.     2) Patient verbalizes consent to proceed with procedure.     3) Location identified with patient prior to procedure start as above.     4) Area cleaned with alcohol swabs.     5) Multiple 2-3 second bursts of cryotherapy were applied to lesion, waiting 1-2 seconds between bursts until blanching occurred. This process was completed 6 times in total.     6) Bandaid applied to cover lesion.     7) Patient tolerated procedure well.  Patient verbalized understanding that pain, as well as blistering or scabbing reaction would likely occur. Patient reminded not pick at the area and to expect possible hypopigmented scars from the procedure. Return if lesion fails to fully resolve.

## 2021-06-23 NOTE — PATIENT INSTRUCTIONS
As disussed, you have already taken appointment with outside facility podiatry.     ============================    After Cryotherapy    The treated area will become red soon after your procedure. It also may blister and swell. If this happens, don't break open the blister.    You may also see clear drainage on the treated area. This is normal.    The treated area will heal in about 7 to 10 days. It will probably not leave a scar.  Caring for yourself after cryotherapy    Starting the day after your procedure, wash the treated area gently with fragrance-free soap and water daily.    Leave the treated area uncovered. Ifyou have any drainainge, you can cover the area with a bandage (Band-Aid ).    If the treated area develops a crust, you can apply petroleum jelly (Vaseline ) on until the crust falls off.    If you have any bleeding, press firmly on the area with a clean gauze pad for 15 minutes. If the bleeding doesn't stop, repeat this step. If the bleeding still hasn't stopped after repeating this step, call your doctor's office.    Don't use scented soap, makeup, or lotion on the treated area until it has healed. This will usually be at least 10 days after your procedure.    You may lose some hair on the treated area. This depends on how deep the freezing went. The hair loss may be permanent.    Once the treated area has healed, apply a broad-spectrum sunscreen with an SPF of at least 30 to the area to protect it from scarring.    You may have discoloration (pinkness, redness, or lighter or darker skin) at the treated area for up to 1 year after your procedure. Some people may have it for even longer or it may be permanent.    
Adequate: hears normal conversation without difficulty

## 2021-06-23 NOTE — PROGRESS NOTES
Assessment and Plan    1. Plantar warts  - DESTRUCT BENIGN LESION, UP TO 14     Patient Instructions   As disussed, you have already taken appointment with outside facility podiatry.     ============================    After Cryotherapy    The treated area will become red soon after your procedure. It also may blister and swell. If this happens, don't break open the blister.    You may also see clear drainage on the treated area. This is normal.    The treated area will heal in about 7 to 10 days. It will probably not leave a scar.  Caring for yourself after cryotherapy    Starting the day after your procedure, wash the treated area gently with fragrance-free soap and water daily.    Leave the treated area uncovered. Ifyou have any drainainge, you can cover the area with a bandage (Band-Aid ).    If the treated area develops a crust, you can apply petroleum jelly (Vaseline ) on until the crust falls off.    If you have any bleeding, press firmly on the area with a clean gauze pad for 15 minutes. If the bleeding doesn't stop, repeat this step. If the bleeding still hasn't stopped after repeating this step, call your doctor's office.    Don't use scented soap, makeup, or lotion on the treated area until it has healed. This will usually be at least 10 days after your procedure.    You may lose some hair on the treated area. This depends on how deep the freezing went. The hair loss may be permanent.    Once the treated area has healed, apply a broad-spectrum sunscreen with an SPF of at least 30 to the area to protect it from scarring.    You may have discoloration (pinkness, redness, or lighter or darker skin) at the treated area for up to 1 year after your procedure. Some people may have it for even longer or it may be permanent.      Return in about 6 months (around 12/23/2021), or if symptoms worsen or fail to improve, for Follow up of last visit.    Helen Murdock MD  [facility]  JANEE Villareal is a 35 year old who presents for the following health issues     HPI     Concern - callus  Onset: ongoing  Description: right foot  Intensity: moderate, severe  Progression of Symptoms:  improving and same  Accompanying Signs & Symptoms: pain  Previous history of similar problem: yes treated with cryotherapy   Precipitating factors:        Worsened by: walking without shoes  Alleviating factors:        Improved by: wearing shoes  Therapies tried and outcome: cryotherapy    Recently seen pt on 5/28/21 for plantar warts cryotherapy done.Pt will need repeat Cryo at this time. Possible podiatry referral.      No Known Allergies     History reviewed. No pertinent past medical history.    History reviewed. No pertinent surgical history.    History reviewed. No pertinent family history.    Social History     Tobacco Use     Smoking status: Never Smoker     Smokeless tobacco: Never Used   Substance Use Topics     Alcohol use: No        Current Outpatient Medications   Medication     cyclobenzaprine (FLEXERIL) 10 MG tablet     predniSONE (DELTASONE) 50 MG tablet     No current facility-administered medications for this visit.             Objective    /80 (Cuff Size: Adult Large)   Pulse 79   Temp 97.7  F (36.5  C) (Tympanic)   Wt 93.4 kg (206 lb)   SpO2 96%   There is no height or weight on file to calculate BMI.  Physical Exam     FOOT EXAM : POSITIVE for Plantar wart on the Rt foot with one completely healed and the other on mid foot remaining though improved from the past measuring 2 cm in diameter

## 2021-10-10 ENCOUNTER — HEALTH MAINTENANCE LETTER (OUTPATIENT)
Age: 36
End: 2021-10-10

## 2022-01-29 ENCOUNTER — HEALTH MAINTENANCE LETTER (OUTPATIENT)
Age: 37
End: 2022-01-29

## 2022-09-18 ENCOUNTER — HEALTH MAINTENANCE LETTER (OUTPATIENT)
Age: 37
End: 2022-09-18

## 2023-05-07 ENCOUNTER — HEALTH MAINTENANCE LETTER (OUTPATIENT)
Age: 38
End: 2023-05-07